# Patient Record
Sex: MALE
[De-identification: names, ages, dates, MRNs, and addresses within clinical notes are randomized per-mention and may not be internally consistent; named-entity substitution may affect disease eponyms.]

---

## 2018-05-08 ENCOUNTER — HOSPITAL ENCOUNTER (EMERGENCY)
Dept: HOSPITAL 42 - ED | Age: 73
Discharge: HOME | End: 2018-05-08
Payer: MEDICARE

## 2018-05-08 VITALS — TEMPERATURE: 97.9 F

## 2018-05-08 VITALS
RESPIRATION RATE: 16 BRPM | HEART RATE: 60 BPM | DIASTOLIC BLOOD PRESSURE: 67 MMHG | SYSTOLIC BLOOD PRESSURE: 140 MMHG | OXYGEN SATURATION: 99 %

## 2018-05-08 VITALS — BODY MASS INDEX: 25.7 KG/M2

## 2018-05-08 DIAGNOSIS — K59.00: Primary | ICD-10-CM

## 2018-05-08 NOTE — RAD
HISTORY:

constipation r/o obstruction  



COMPARISON:

No prior.



FINDINGS:



BOWEL:

There is moderate distention of the colon with no obvious fecal 

retention.  Findings may represent a colonic ileus. There is no small 

bowel dilatation 



BONES:

Normal.



OTHER FINDINGS:

None.



IMPRESSION:

There is moderate distention of the colon with no obvious fecal 

retention.  Findings may represent a colonic ileus. There is no small 

bowel dilatation

## 2018-05-08 NOTE — ED PDOC
Arrival/HPI





- General


Chief Complaint: GI Problem


Time Seen by Provider: 05/08/18 13:41


Historian: Patient





- History of Present Illness


Narrative History of Present Illness (Text): 





05/08/18 15:19


73yo male with PMhx of hypertension, hyperlipdemia, BPH who present with one 

month history of intermittent constipation. States he saw his Doctor was was 

given OTC medication for constipation. He states he used Magnesium Citrate with 

mild relieve. He denies abdominal pain, nausea, vomiting, melena, hematemesis, 

any other complaint.





Past Medical History





- Provider Review


Nursing Documentation Reviewed: Yes





- Infectious Disease


Hx of Infectious Diseases: None





- Cardiac


Hx Cardiac Disorders: Yes


Hx MI: Yes


Hx Hypertension: Yes





- Pulmonary


Hx Respiratory Disorders: No





- Neurological


Hx Neurological Disorder: No





- HEENT


Hx HEENT Disorder: No





- Renal


Hx Renal Disorder: No





- Endocrine/Metabolic


Hx Endocrine Disorders: No





- Hematological/Oncological


Hx Blood Disorders: Yes


Hx Cancer: Yes (COLON)





- Integumentary


Hx Dermatological Disorder: No





- Musculoskeletal/Rheumatological


Hx Musculoskeletal Disorders: No





- Gastrointestinal


Hx Gastrointestinal Disorders: Yes


Other/Comment: colon CA with chemo





- Genitourinary/Gynecological


Hx Genitourinary Disorders: Yes


Hx Prostate Problems: Yes (enlarged)





- Psychiatric


Hx Psychophysiologic Disorder: Yes


Hx Depression: Yes


Hx Substance Use: No





- Surgical History


Hx Coronary Stent: Yes





- Anesthesia


Hx Anesthesia: Yes





- Suicidal Assessment


Feels Threatened In Home Enviroment: No





Family/Social History





- Physician Review


Nursing Documentation Reviewed: Yes


Family/Social History: Unknown Family HX


Smoking Status: Former Smoker


Hx Alcohol Use: Yes (QUIT 3 YRS AGO- PAST ETOH ABUSE)


Hx Substance Use: No





Allergies/Home Meds


Allergies/Adverse Reactions: 


Allergies





No Known Allergies Allergy (Verified 05/08/18 13:26)


 








Home Medications: 


 Home Meds











 Medication  Instructions  Recorded  Confirmed


 


Tamsulosin [Flomax] 0.4 mg PO QPM 09/24/15 05/08/18


 


Clopidogrel [Plavix] 75 mg PO DAILY 09/15/16 05/08/18


 


Metoprolol Tartrate [Lopressor] 25 mg PO BID 09/15/16 05/08/18


 


Simvastatin [Simvastatin] 80 mg PO DAILY 05/08/18 05/08/18














Review of Systems





- Physician Review


All systems were reviewed & negative as marked: Yes





- Review of Systems


Constitutional: Normal


Eyes: Normal


ENT: Normal


Respiratory: Normal


Cardiovascular: Normal


Gastrointestinal: Constipation.  absent: Abdominal Pain, Diarrhea, Nausea, 

Vomiting, Hematochezia, Hematemesis


Genitourinary Male: Normal


Musculoskeletal: Normal


Skin: Normal


Neurological: Normal


Endocrine: Normal


Hemo/Lymphatic: Normal


Psychiatric: Normal





Physical Exam


Vital Signs Reviewed: Yes


Vital Signs











  Temp Pulse Resp BP Pulse Ox


 


 05/08/18 15:11   59 L  18  135/71  98


 


 05/08/18 13:28  97.9 F  57 L  16  137/77  98











Temperature: Afebrile


Blood Pressure: Normal


Pulse: Regular


Respiratory Rate: Normal


Appearance: Positive for: Well-Appearing, Non-Toxic, Comfortable


Pain Distress: None


Mental Status: Positive for: Alert and Oriented X 3





- Systems Exam


Head: Present: Atraumatic, Normocephalic


Pupils: Present: PERRL


Extroacular Muscles: Present: EOMI


Conjunctiva: Present: Normal


Mouth: Present: Moist Mucous Membranes


Neck: Present: Normal Range of Motion


Respiratory/Chest: Present: Clear to Auscultation, Good Air Exchange.  No: 

Respiratory Distress, Accessory Muscle Use


Cardiovascular: Present: Regular Rate and Rhythm, Normal S1, S2.  No: Murmurs


Abdomen: Present: Other (soft).  No: Tenderness, Distention, Peritoneal Signs, 

Rebound, Guarding, McBurney's Point Tender, Rovsing's Sign Present


Back: Present: Normal Inspection


Upper Extremity: Present: Normal Inspection.  No: Cyanosis, Edema


Lower Extremity: Present: Normal Inspection.  No: Edema


Neurological: Present: GCS=15, CN II-XII Intact, Speech Normal


Skin: Present: Warm, Dry, Normal Color.  No: Rashes


Psychiatric: Present: Alert, Oriented x 3, Normal Insight, Normal Concentration





Medical Decision Making


ED Course and Treatment: 





05/08/18 19:43


PT presented for stated history. He denied abdominal pain, nausea, vomiting in 

ED. His PE was benign, soft. 





Abdominal xray - No air/fluid level. No fecal material noted.





Result was DW the pt. He was referred to GI. Mirlax rx given.











- RAD Interpretation


Radiology Orders: 








05/08/18 13:53


ABDOMEN (FLAT PLATE) 1VIEW [RAD] Stat 














- Medication Orders


Current Medication Orders: 











Discontinued Medications





Sodium Phosphate (Fleet Enema)  135 ml RC STAT STA


   Stop: 05/08/18 13:55


   Last Admin: 05/08/18 15:03  Dose: 135 ml











Disposition/Present on Arrival





- Present on Arrival


Any Indicators Present on Arrival: No


History of DVT/PE: No


History of Uncontrolled Diabetes: No


Urinary Catheter: No


History of Decub. Ulcer: No


History Surgical Site Infection Following: None





- Disposition


Have Diagnosis and Disposition been Completed?: Yes


Diagnosis: 


 Constipation





Disposition: HOME/ ROUTINE


Disposition Time: 15:25


Patient Plan: Discharge


Patient Problems: 


 Current Active Problems











Problem Status Onset


 


Constipation Acute  











Condition: STABLE


Discharge Instructions (ExitCare):  Constipation, Adult (DC)


Additional Instructions: 


follow up with your Doctor/Gastroenterologist


Return to ED for any new or worsening symptoms


Prescriptions: 


Polyethylene Glycol 3350 [Miralax] 17 gm PO BID #200 [email or web address]


Referrals: 


Cristopher Padilla DO [Staff Provider] - Follow up with primary


Forms:  [employer] (English)

## 2019-04-05 NOTE — CARD
--------------- APPROVED REPORT --------------





Date of service: 04/05/2019



EKG Measurement

Heart [id number]

[id number]

[id number]

[id number]

[id number]



<Conclusion>

Sinus bradycardia

Otherwise normal ECG